# Patient Record
Sex: MALE
[De-identification: names, ages, dates, MRNs, and addresses within clinical notes are randomized per-mention and may not be internally consistent; named-entity substitution may affect disease eponyms.]

---

## 2021-06-24 PROBLEM — Z00.00 ENCOUNTER FOR PREVENTIVE HEALTH EXAMINATION: Status: ACTIVE | Noted: 2021-06-24

## 2021-06-28 ENCOUNTER — APPOINTMENT (OUTPATIENT)
Dept: SURGERY | Facility: CLINIC | Age: 36
End: 2021-06-28
Payer: COMMERCIAL

## 2021-06-28 VITALS
TEMPERATURE: 97.2 F | SYSTOLIC BLOOD PRESSURE: 115 MMHG | HEIGHT: 69 IN | BODY MASS INDEX: 31.55 KG/M2 | DIASTOLIC BLOOD PRESSURE: 77 MMHG | OXYGEN SATURATION: 97 % | WEIGHT: 213 LBS | HEART RATE: 77 BPM

## 2021-06-28 DIAGNOSIS — S39.011A STRAIN OF MUSCLE, FASCIA AND TENDON OF ABDOMEN, INITIAL ENCOUNTER: ICD-10-CM

## 2021-06-28 DIAGNOSIS — K46.9 UNSPECIFIED ABDOMINAL HERNIA W/OUT OBSTRUCTION OR GANGRENE: ICD-10-CM

## 2021-06-28 PROCEDURE — 99203 OFFICE O/P NEW LOW 30 MIN: CPT

## 2021-06-28 PROCEDURE — 99072 ADDL SUPL MATRL&STAF TM PHE: CPT

## 2021-07-19 NOTE — DATA REVIEWED
[FreeTextEntry1] : [place] 5-6-21 report from [facility] reviewed. No evidence of hernia. Images reviewed.

## 2021-07-19 NOTE — PHYSICAL EXAM
[Purpura] : no purpura  [Alert] : alert [Oriented to Person] : oriented to person [Oriented to Place] : oriented to place [Oriented to Time] : oriented to time [Calm] : calm [de-identified] : ELODIA. Lulu. Appropriate. Comfortable. [de-identified] : Pupils equal. No Scleral Icterus. NCAT. [de-identified] : Supple. Trachea midline. No overt lymphadenopathy. No JVD [de-identified] : Abdomen is soft, non tender and non distended. Do not appreciate any overt mass. No overt hernia detected.

## 2021-07-19 NOTE — ASSESSMENT
[FreeTextEntry1] : 36 year old male. History and physical examination are consistent with muscular/groin strain. We discussed at home treatment options. We discussed the issues related to potential hernia and he expressed understanding. He will seek treatment if they occur.

## 2021-07-19 NOTE — HISTORY OF PRESENT ILLNESS
[de-identified] : 36 Year old male. . Was decorating his home with his wife in December 2020 and needed to move a large fish tank (75 gallons). He felt immediate discomfort right abdominal wall and groin. Never noted a bulge. Over a few weeks got better. His PCP sent him for US of the abdominal wall to rule out hernia. I have reviewed these films from May [id number]. There is no hernia evident on US. The patient currently feels well and is asymptomatic.